# Patient Record
Sex: MALE
[De-identification: names, ages, dates, MRNs, and addresses within clinical notes are randomized per-mention and may not be internally consistent; named-entity substitution may affect disease eponyms.]

---

## 2023-09-05 ENCOUNTER — NON-APPOINTMENT (OUTPATIENT)
Age: 23
End: 2023-09-05

## 2024-02-29 ENCOUNTER — NON-APPOINTMENT (OUTPATIENT)
Age: 24
End: 2024-02-29

## 2024-08-10 ENCOUNTER — NON-APPOINTMENT (OUTPATIENT)
Age: 24
End: 2024-08-10

## 2024-11-04 ENCOUNTER — EMERGENCY (EMERGENCY)
Facility: HOSPITAL | Age: 24
LOS: 1 days | Discharge: HOME | End: 2024-11-04
Attending: STUDENT IN AN ORGANIZED HEALTH CARE EDUCATION/TRAINING PROGRAM | Admitting: STUDENT IN AN ORGANIZED HEALTH CARE EDUCATION/TRAINING PROGRAM
Payer: MEDICAID

## 2024-11-04 VITALS
TEMPERATURE: 99 F | HEART RATE: 81 BPM | RESPIRATION RATE: 19 BRPM | DIASTOLIC BLOOD PRESSURE: 79 MMHG | SYSTOLIC BLOOD PRESSURE: 130 MMHG | OXYGEN SATURATION: 100 %

## 2024-11-04 VITALS
SYSTOLIC BLOOD PRESSURE: 133 MMHG | DIASTOLIC BLOOD PRESSURE: 80 MMHG | HEART RATE: 80 BPM | OXYGEN SATURATION: 100 % | TEMPERATURE: 99 F | HEIGHT: 73 IN | WEIGHT: 220.46 LBS | RESPIRATION RATE: 18 BRPM

## 2024-11-04 PROCEDURE — 99284 EMERGENCY DEPT VISIT MOD MDM: CPT

## 2024-11-04 PROCEDURE — 73030 X-RAY EXAM OF SHOULDER: CPT | Mod: 26,RT

## 2024-11-04 PROCEDURE — 73030 X-RAY EXAM OF SHOULDER: CPT

## 2024-11-04 PROCEDURE — 73564 X-RAY EXAM KNEE 4 OR MORE: CPT | Mod: 26,RT

## 2024-11-04 PROCEDURE — 73564 X-RAY EXAM KNEE 4 OR MORE: CPT

## 2024-11-04 RX ORDER — ACETAMINOPHEN 500 MG
650 TABLET ORAL ONCE
Refills: 0 | Status: COMPLETED | OUTPATIENT
Start: 2024-11-04 | End: 2024-11-04

## 2024-11-04 RX ADMIN — Medication 650 MILLIGRAM(S): at 22:11

## 2024-11-04 RX ADMIN — Medication 650 MILLIGRAM(S): at 22:17

## 2024-11-04 NOTE — ED PROVIDER NOTE - CARE PROVIDER_API CALL
Afshin Brady  Orthopaedic Surgery  [address] [place] [id number]  Phone: [phone]  Fax: [phone]  Follow Up Time: 1-3 Days

## 2024-11-04 NOTE — ED ADULT NURSE NOTE - OBJECTIVE STATEMENT
patient A&Ox3 in no acute distress c/o of R Knee and R shoulder pain L knee ablation , patient injured his knee and shoulder on Saturday while playing football, denied head injury no LOC at the time , no other c/o at this time, patient refuses the pain medication at this time

## 2024-11-04 NOTE — ED PROVIDER NOTE - PHYSICAL EXAMINATION
Gen: NAD, non-toxic appearing, awake alert   HEENT: normal conjunctiva, oral mucosa moist  Lung: CTAB, no respiratory distress, no wheezes/rhonchi/rales B/L, speaking in full sentences  CV: RRR  Abd: soft, NT, ND, no guarding, no rigidity, no rebound tenderness  MSK: no visible deformities, ROM normal in UE/LE, TTP of R shoulder and knee with some swelling noted around R knee. 2+ pulses in all extremities, healing abrasion noted to  L knee , ambulating w/o difficulty  Neuro: No focal sensory or motor deficits  Skin: Warm, well perfused, no rash, no leg swelling

## 2024-11-04 NOTE — ED PROVIDER NOTE - PATIENT PORTAL LINK FT
You can access the FollowMyHealth Patient Portal offered by [facility] by registering at the following website: [email or web address]. By joining [facility]’s FollowMyHealth portal, you will also be able to view your health information using other applications (apps) compatible with our system.

## 2024-11-04 NOTE — ED PROVIDER NOTE - NSFOLLOWUPINSTRUCTIONS_ED_ALL_ED_FT
Please take over the counter pain medications such as Motrin (600mg every 6 hours) and Tylenol (650mg every 4 hours) for pain. You can apply ice as well. Elevate your affected leg on 3-4 pillows when laying down    Follow up with Orthopedics in 3-5 days for evaluation. Keep the sling and knee immobilizer on until then    Return to the Emergency Department if you have any new or worsening symptoms

## 2024-11-04 NOTE — ED ADULT NURSE NOTE - NSFALLUNIVINTERV_ED_ALL_ED
Bed/Stretcher in lowest position, wheels locked, appropriate side rails in place/Call bell, personal items and telephone in reach/Instruct patient to call for assistance before getting out of bed/chair/stretcher/Non-slip footwear applied when patient is off stretcher/[place] to call system/Physically safe environment - no spills, clutter or unnecessary equipment/Purposeful proactive rounding/Room/bathroom lighting operational, light cord in reach

## 2024-11-04 NOTE — ED PROVIDER NOTE - PROGRESS NOTE DETAILS
imaging nonactionable on my independent interpretation. pt given sling and knee immobilizer. explained results of w/u. will dc to home with ortho f/u and return precautions

## 2024-11-04 NOTE — ED PROVIDER NOTE - OBJECTIVE STATEMENT
24M p/w R shoulder pain and R knee pain after playing football 2 days. He swiped at the ball with his R arm and felt a pop in his R shoulder. He also fell onto his knees and has R knee pain with swelling. has an abrasion to L knee but no pain. Took motrin at home. Declining pain meds at this time

## 2024-11-04 NOTE — ED ADULT NURSE REASSESSMENT NOTE - NS ED NURSE REASSESS COMMENT FT1
patient A&Ox3 in no acute distress discharge as orders no heplock refuses the discharge vital sigh left ER self ambulated refuses the crackers'

## 2024-11-04 NOTE — ED PROVIDER NOTE - CLINICAL SUMMARY MEDICAL DECISION MAKING FREE TEXT BOX
24M p/w R shoulder pain and R knee pain after playing football 2 days. VS and exam as above  DDx includes but not limited to: fx vs dislocation vs MSK injury (ligament vs rotator cuff injury)  Plan: xr's, pt declining pain meds, reassess symptoms    See Progress Notes for further updates on ED Course

## 2024-11-08 NOTE — ED POST DISCHARGE NOTE - DETAILS
results of xray d/w pt states still having some knee pain but shoulder is better has ortho appt coming up